# Patient Record
Sex: MALE | URBAN - METROPOLITAN AREA
[De-identification: names, ages, dates, MRNs, and addresses within clinical notes are randomized per-mention and may not be internally consistent; named-entity substitution may affect disease eponyms.]

---

## 2018-04-14 LAB
ALBUMIN SERPL BCP-MCNC: 4.6 G/DL (ref 3.5–5.7)
ALP SERPL-CCNC: 98 IU/L (ref 40–150)
ALT SERPL W P-5'-P-CCNC: 16 IU/L (ref 0–50)
ANION GAP SERPL CALCULATED.3IONS-SCNC: 13.8 MM/L
ANISOCYTOSIS (HISTORICAL): SLIGHT
APTT PPP: 31 SEC (ref 24.4–37.6)
AST SERPL W P-5'-P-CCNC: 13 U/L (ref 8–27)
BANDS (HISTORICAL): 2
BASOPHILS # BLD AUTO: 0 X3/UL (ref 0–0.3)
BASOPHILS # BLD AUTO: 0.1 % (ref 0–2)
BILIRUB SERPL-MCNC: 0.8 MG/DL (ref 0.3–1)
BUN SERPL-MCNC: 11 MG/DL (ref 7–25)
CALCIUM SERPL-MCNC: 9.5 MG/DL (ref 8.6–10.5)
CHLORIDE SERPL-SCNC: 102 MM/L (ref 98–107)
CO2 SERPL-SCNC: 22 MM/L (ref 21–31)
CREAT SERPL-MCNC: 1.02 MG/DL (ref 0.7–1.3)
DEPRECATED RDW RBC AUTO: 13.8 % (ref 11.5–14.5)
EGFR (HISTORICAL): > 60 GFR
EGFR AFRICAN AMERICAN (HISTORICAL): > 60 GFR
EOSINOPHIL # BLD AUTO: 0.1 X3/UL (ref 0–0.5)
EOSINOPHIL NFR BLD AUTO: 0.3 % (ref 0–5)
GLUCOSE (HISTORICAL): 114 MG/DL (ref 65–99)
HCT VFR BLD AUTO: 49.9 % (ref 42–52)
HGB BLD-MCNC: 16.2 G/DL (ref 14–18)
INR PPP: 1.19 (ref 0.9–1.5)
LACTATE DEHYDROGENASE FLUID (HISTORICAL): 1.6 MM/L (ref 0.5–2)
LYMPHOCYTES # BLD AUTO: 1.5 X3/UL (ref 1.2–4.2)
LYMPHOCYTES NFR BLD AUTO: 5 % (ref 20.5–51.1)
LYMPHOCYTES NFR BLD AUTO: 6.7 % (ref 20.5–51.1)
MACROCYTOSIS (HISTORICAL): SLIGHT
MCH RBC QN AUTO: 29.4 PG (ref 26–34)
MCHC RBC AUTO-ENTMCNC: 32.5 G/DL (ref 31–36)
MCV RBC AUTO: 90.7 FL (ref 81–99)
MONOCYTES # BLD AUTO: 1.5 X3/UL (ref 0–1)
MONOCYTES (HISTORICAL): 5 % (ref 1.7–12)
MONOCYTES NFR BLD AUTO: 7 % (ref 1.7–12)
NEUTROPHILS # BLD AUTO: 18.8 X3/UL (ref 1.4–6.5)
NEUTROPHILS ABS COUNT (HISTORICAL): 88 % (ref 42.2–75.2)
NEUTS SEG NFR BLD AUTO: 85.9 % (ref 42.2–75.2)
OSMOLALITY, SERUM (HISTORICAL): 269 MOSM (ref 262–291)
OVALOCYTOSIS (HISTORICAL): SLIGHT
PLATELET # BLD AUTO: 252 X3/UL (ref 130–400)
PLATELET ESTIMATE (HISTORICAL): NORMAL
PMV BLD AUTO: 8.1 FL (ref 8.6–11.7)
POTASSIUM SERPL-SCNC: 3.8 MM/L (ref 3.5–5.5)
PROTHROMBIN TIME (HISTORICAL): 13.8 SEC (ref 10.1–12.9)
RBC # BLD AUTO: 5.5 X6/UL (ref 4.3–5.9)
SODIUM SERPL-SCNC: 134 MM/L (ref 134–143)
TEAR DROP CELLS (HISTORICAL): SLIGHT
TOTAL PROTEIN (HISTORICAL): 6.9 G/DL (ref 6.4–8.9)
WBC # BLD AUTO: 21.9 X3/UL (ref 4.8–10.8)

## 2018-04-15 LAB
ALBUMIN SERPL BCP-MCNC: 3.8 G/DL (ref 3.5–5.7)
ALP SERPL-CCNC: 81 IU/L (ref 40–150)
ALT SERPL W P-5'-P-CCNC: 13 IU/L (ref 0–50)
ANION GAP SERPL CALCULATED.3IONS-SCNC: 10 MM/L
AST SERPL W P-5'-P-CCNC: 11 U/L (ref 8–27)
BASOPHILS # BLD AUTO: 0 X3/UL (ref 0–0.3)
BASOPHILS # BLD AUTO: 0.1 % (ref 0–2)
BILIRUB SERPL-MCNC: 1.1 MG/DL (ref 0.3–1)
BUN SERPL-MCNC: 11 MG/DL (ref 7–25)
CALCIUM SERPL-MCNC: 8.7 MG/DL (ref 8.6–10.5)
CHLORIDE SERPL-SCNC: 109 MM/L (ref 98–107)
CO2 SERPL-SCNC: 23 MM/L (ref 21–31)
CREAT SERPL-MCNC: 1.14 MG/DL (ref 0.7–1.3)
DEPRECATED RDW RBC AUTO: 14.1 % (ref 11.5–14.5)
EGFR (HISTORICAL): > 60 GFR
EGFR AFRICAN AMERICAN (HISTORICAL): > 60 GFR
EOSINOPHIL # BLD AUTO: 0.2 X3/UL (ref 0–0.5)
EOSINOPHIL NFR BLD AUTO: 1 % (ref 0–5)
GLUCOSE (HISTORICAL): 125 MG/DL (ref 65–99)
HCT VFR BLD AUTO: 45.4 % (ref 42–52)
HGB BLD-MCNC: 15 G/DL (ref 14–18)
LYMPHOCYTES # BLD AUTO: 3.5 X3/UL (ref 1.2–4.2)
LYMPHOCYTES NFR BLD AUTO: 17.8 % (ref 20.5–51.1)
MAGNESIUM SERPL-MCNC: 2.1 MG/DL (ref 1.9–2.7)
MCH RBC QN AUTO: 30.4 PG (ref 26–34)
MCHC RBC AUTO-ENTMCNC: 33 G/DL (ref 31–36)
MCV RBC AUTO: 92 FL (ref 81–99)
MONOCYTES # BLD AUTO: 1.4 X3/UL (ref 0–1)
MONOCYTES NFR BLD AUTO: 7.1 % (ref 1.7–12)
NEUTROPHILS # BLD AUTO: 14.3 X3/UL (ref 1.4–6.5)
NEUTS SEG NFR BLD AUTO: 74 % (ref 42.2–75.2)
OSMOLALITY, SERUM (HISTORICAL): 277 MOSM (ref 262–291)
PHOSPHATE SERPL-MCNC: 2.8 MG/DL (ref 3–5.5)
PLATELET # BLD AUTO: 225 X3/UL (ref 130–400)
PMV BLD AUTO: 8.1 FL (ref 8.6–11.7)
POTASSIUM SERPL-SCNC: 4 MM/L (ref 3.5–5.5)
RBC # BLD AUTO: 4.93 X6/UL (ref 4.3–5.9)
SODIUM SERPL-SCNC: 138 MM/L (ref 134–143)
TOTAL PROTEIN (HISTORICAL): 5.8 G/DL (ref 6.4–8.9)
WBC # BLD AUTO: 19.4 X3/UL (ref 4.8–10.8)

## 2018-04-16 LAB
BASOPHILS # BLD AUTO: 0 X3/UL (ref 0–0.3)
BASOPHILS # BLD AUTO: 0.3 % (ref 0–2)
DEPRECATED RDW RBC AUTO: 13.9 % (ref 11.5–14.5)
EOSINOPHIL # BLD AUTO: 0.2 X3/UL (ref 0–0.5)
EOSINOPHIL NFR BLD AUTO: 1.6 % (ref 0–5)
EST. AVERAGE GLUCOSE BLD GHB EST-MCNC: 110 MG/DL
HBA1C MFR BLD HPLC: 5.5 % (ref 4–6.2)
HCT VFR BLD AUTO: 44.7 % (ref 42–52)
HGB BLD-MCNC: 14.5 G/DL (ref 14–18)
LYMPHOCYTES # BLD AUTO: 2.7 X3/UL (ref 1.2–4.2)
LYMPHOCYTES NFR BLD AUTO: 21.5 % (ref 20.5–51.1)
MCH RBC QN AUTO: 30 PG (ref 26–34)
MCHC RBC AUTO-ENTMCNC: 32.6 G/DL (ref 31–36)
MCV RBC AUTO: 92.1 FL (ref 81–99)
MONOCYTES # BLD AUTO: 1 X3/UL (ref 0–1)
MONOCYTES NFR BLD AUTO: 7.5 % (ref 1.7–12)
NEUTROPHILS # BLD AUTO: 8.8 X3/UL (ref 1.4–6.5)
NEUTS SEG NFR BLD AUTO: 69.1 % (ref 42.2–75.2)
PLATELET # BLD AUTO: 223 X3/UL (ref 130–400)
PMV BLD AUTO: 8.3 FL (ref 8.6–11.7)
RBC # BLD AUTO: 4.85 X6/UL (ref 4.3–5.9)
VANCOMYCIN TROUGH (HISTORICAL): 9.6 MCQ/M
WBC # BLD AUTO: 12.7 X3/UL (ref 4.8–10.8)

## 2018-04-17 LAB
ANION GAP SERPL CALCULATED.3IONS-SCNC: 11.9 MM/L
BASOPHILS # BLD AUTO: 0 X3/UL (ref 0–0.3)
BASOPHILS # BLD AUTO: 0.5 % (ref 0–2)
BUN SERPL-MCNC: 10 MG/DL (ref 7–25)
CALCIUM SERPL-MCNC: 8.6 MG/DL (ref 8.6–10.5)
CHLORIDE SERPL-SCNC: 107 MM/L (ref 98–107)
CO2 SERPL-SCNC: 25 MM/L (ref 21–31)
CREAT SERPL-MCNC: 1.06 MG/DL (ref 0.7–1.3)
DEPRECATED RDW RBC AUTO: 13.8 % (ref 11.5–14.5)
EGFR (HISTORICAL): > 60 GFR
EGFR AFRICAN AMERICAN (HISTORICAL): > 60 GFR
EOSINOPHIL # BLD AUTO: 0.3 X3/UL (ref 0–0.5)
EOSINOPHIL NFR BLD AUTO: 2.9 % (ref 0–5)
GLUCOSE (HISTORICAL): 89 MG/DL (ref 65–99)
HCT VFR BLD AUTO: 44.7 % (ref 42–52)
HGB BLD-MCNC: 14.9 G/DL (ref 14–18)
LYMPHOCYTES # BLD AUTO: 3.2 X3/UL (ref 1.2–4.2)
LYMPHOCYTES NFR BLD AUTO: 34.9 % (ref 20.5–51.1)
MCH RBC QN AUTO: 30.3 PG (ref 26–34)
MCHC RBC AUTO-ENTMCNC: 33.2 G/DL (ref 31–36)
MCV RBC AUTO: 91.3 FL (ref 81–99)
MONOCYTES # BLD AUTO: 0.7 X3/UL (ref 0–1)
MONOCYTES NFR BLD AUTO: 7.4 % (ref 1.7–12)
NEUTROPHILS # BLD AUTO: 4.9 X3/UL (ref 1.4–6.5)
NEUTS SEG NFR BLD AUTO: 54.3 % (ref 42.2–75.2)
OSMOLALITY, SERUM (HISTORICAL): 278 MOSM (ref 262–291)
PHOSPHATE SERPL-MCNC: 3.9 MG/DL (ref 3–5.5)
PLATELET # BLD AUTO: 235 X3/UL (ref 130–400)
PMV BLD AUTO: 8.3 FL (ref 8.6–11.7)
POTASSIUM SERPL-SCNC: 3.9 MM/L (ref 3.5–5.5)
RBC # BLD AUTO: 4.9 X6/UL (ref 4.3–5.9)
SODIUM SERPL-SCNC: 140 MM/L (ref 134–143)
WBC # BLD AUTO: 9.1 X3/UL (ref 4.8–10.8)

## 2018-05-14 ENCOUNTER — LAB REQUISITION (OUTPATIENT)
Dept: LAB | Facility: HOSPITAL | Age: 48
End: 2018-05-14
Payer: COMMERCIAL

## 2018-05-14 DIAGNOSIS — T81.89XA OTHER COMPLICATIONS OF PROCEDURES, NOT ELSEWHERE CLASSIFIED, INITIAL ENCOUNTER: ICD-10-CM

## 2018-05-14 LAB — SURGICAL PATHOLOGY (HISTORICAL): NORMAL

## 2018-05-14 PROCEDURE — 88304 TISSUE EXAM BY PATHOLOGIST: CPT | Performed by: PATHOLOGY

## 2019-02-04 ENCOUNTER — CLINICAL SUPPORT (OUTPATIENT)
Dept: CARDIAC REHAB | Facility: HOSPITAL | Age: 49
End: 2019-02-04
Payer: COMMERCIAL

## 2019-02-04 VITALS — BODY MASS INDEX: 27.9 KG/M2 | WEIGHT: 206 LBS | HEIGHT: 72 IN

## 2019-02-04 DIAGNOSIS — Z95.5 STENTED CORONARY ARTERY: ICD-10-CM

## 2019-02-04 PROCEDURE — 93798 PHYS/QHP OP CAR RHAB W/ECG: CPT | Performed by: PHYSICAL THERAPIST

## 2019-02-04 NOTE — PROGRESS NOTES
Cardiac Rehabilitation Plan of Care   Care Plan       Today's date: 2019   Visits: 1 out of 36  Patient name: Milburn Prader      : 1970  Age: 51 y o  MRN: [id number]  Referring Physician: Macie Park  Provider: [facility], [facility]  Clinician: ALOK Gudino    Dx: MI  Date of onset:19      SUMMARY OF PROGRESS:  Patient willing to participate in CR 3 X week X 12 weeks or 36 sessions  Patient seen for initial evaluation this date and performed Sub Max EET on TM  Resting vitals: HR 85, /58 and O2 Sat 95%  Peak vitals HR: 112, /84 and O2 Sat 93%  Recovery vitals: HR 76, /68 and O2 Sat 94%  Patient denied any angina or SOB  RPE 3-5 during sub max test  Patient was able to achieve a 5 8 MET level  Patient's goals are to improve his activity tolerance in order to return to full duty work at [employer]  Patient must be able to tolerate heavy duty work in high temperatures  Patient would also like to work on smoking cessation and improve his diet  Patient is an excellent rehabilitation candidate due to his high prior level of function and willingness to participate/improve  Patient issued handouts on BP categories, heart attack zone tool and heart healthy eating      Medication compliance: Yes   Comments: Patient admits to 100% medication compliane  Fall Risk: Low   Comments: Patient exhibits Normal standing balance and 5/5 BLE strength    EKG changes: Normal Sinus w/T wave inversion      EXERCISE ASSESSMENT and PLAN    Current Exercise Program in Rehab:       Frequency: 3 days/week        Minutes: 30-40         METS: 3 to 4            HR: 20-30 > RHR of 85   RPE:4-6         Modalities: Treadmill, UBE and NuStep      Exercise Progression 30 Day Goals :    Frequency: 5 days/week   Minutes: 45-50   METS: 4 to 5   HR: 20-30 > RHR   RPE: 4-6   Modalities: Treadmill, UBE, Eliptical , NuStep and Recumbent bike    Strength trainin-3 days / week, 12-15 repitations and 1-2 sets per modality    Modalities: Leg Press and Arm Curl    Progressing: In Progress    Home Exercise: Home walking program 2-3 days per week  Goals: 10% improvement in functional capacity, Reduced Dyspnea with physical activity  0-1/10, improved DASI score by 10%, Increase in peak CR METs by 40%, Improved 6MWT distance by 10%, Resume ADLs with increased strength, Return to work unrestricted and Exercise 5 days/wk, >150mins/wk  Education: Benefit of exercise for CAD risk factors, home exercise guidelines, signs and sxs and RPE scale   Plan:education on home exercise guidelines  Readiness to change: Action      NUTRITION ASSESSMENT AND PLAN    Weight control:    Starting weight: 206 lb   Current weight:   206 lb  Waist circumference:    Starting:    Current:    Diabetes: N/A  Lipid management: Discussed diet and lipid management  Goals:reduced BMI to < 25 and Improved Rate Your Plate score  >57  Education: heart healthy eating  low sodium diet  hydration  diet and lipid management  healthy choices while dining out  portion control  Progressing: In Progress  Plan: Increase whole grains, increase fruits/vegs, eliminate processed meats, reduce red meat 1x/wk, swtich to low fat dairy and Reduce added sugars <25g/day  Readiness to change: Action      PSYCHOSOCIAL ASSESSMENT AND PLAN    Emotional:              1-4 = Minimal Depression  Self-reported stress level: 5   Social support: Good   Goals:  improved [facility] QOL < 27, PHQ-9 - reduced severity by one level, Increased interest in doing things, improved sleep and improved positive thoughts of well being  Education: signs/sxs of depression, benefits of positive support system, coping mechanisms and depression and CAD  Progressing: In Progress  Plan: Practice relaxation techniques  Readiness to change: Action      OTHER CORE COMPONENTS     Tobacco: Patient states he smokes 2 cigarettes per day   Patient states he had been smoking up to 2 packs per day at one point   History   Smoking Status    Not on file   Smokeless Tobacco    Not on file       Tobacco Use Intervention: Referral to tobacco expert:   Brief counseling by cardiac rehab professional  Date: 19    Blood pressure:    Restin/58   Exercise: 152/84   Recovery: 120/68  Goals: consistent BP < 130/80, reduced dietary sodium <2300mg, consistent exercise >150 mins/wk, reduce number of cigarettes/day and set a target quit date  Education:  understanding HTN and CAD, low sodium diet and HTN, tobacco triggers, setting a smoking cessation plan, relapse education and provide information on tobacco cessation treatment  Progressing: In Progress  Plan: Class: Understanding Heart Disease, Class: Common Heart Medications, Set target quit date for smoking, Complete abstention from smoking and speak to MD about nicotine replacement therapy  Readiness to change: Preparation

## 2019-02-04 NOTE — PROGRESS NOTES
Name:Waylon Dow  Risk:      MOD        EF %                          Pre Post       Date: 02/04/2019   Goal      Physical         Sub Max ETT (METs) 5 8  10% increase      6MWT (Feet/METs)   10% increase      PAGE AI (est  peak O2)         Peak exercise CR METs   40% increase      Emotional         PHQ 9  (> 5 refer to MD) 3  4 pt decrease      [facility]           Total Score 25  < 27    (lower score = improvement)     Feelings 1  < 3      Physical Fitness 4  < 3      Social Support 5  < 3      Daily Activity 3  < 3      Social Activities 1  < 3      Pain 3  < 3      Overall Health 3  < 3      Quality of Life 4  < 3      Change in Health 3  < 3      Dietary         Rate your plate 62  > 58      Measurements         Weight 206  2 5-5%       BMI 6'  19-25      Waist Circ     < 40 M / < 35 F      % Body fat NA  < 25 M / < 33 F       BP left arm     (systolic) 282  < 754                                 (diastolic) 58  < 90      Smoking #/day (if applicable) 2 cigs per day  0      Lipids/ Glucose (Date)         Total cholesterol na        Triglycerides na  < 150      HDL na  40-60      LDL na  < 100      A1C % na  4 0 - 5 6%      Fasting Blood Glucose na

## 2019-02-04 NOTE — PROGRESS NOTES
CARDIAC REHAB ASSESSMENT    Today's date: 2019  Patient name: Antoine Fleming     : 1970       MRN: [id number]  PCP: Josefina Malcolm DO  Referring Physician: Brandon Interiano  Cardiologist: Dr Dez Malik  Surgeon:   Dx: MI  Date of onset: 19  Cultural needs: None    Height:  6'0"   Wt Readings from Last 1 Encounters:   No data found for Wt      Weight: 206 LB  Ht Readings from Last 1 Encounters:   No data found for Ht     Medical History: Per patient HTN, MI and basal cell skin cancer on his left shoulder  Physical Limitations: None    Risk Factors   Cholesterol: Yes  Smoking: Current user:  average ppd:  2 cigs per day  HTN: Yes  DM: No  Obesity: No   Inactivity: No  Family History:No family history on file  Allergies: Patient has no allergy information on record  ETOH:   History   Alcohol use Not on file         Current Medications: Per patient Aspirin 81 mg 1X, Metoprolol 50 mg 1X, Atorvastatin 80 mg 1X, Clopidogrel 75 mg 1X and Losartan 25 mg 1X  No current outpatient prescriptions on file  No current facility-administered medications for this visit  Functional Status Prior to Diagnosis for Treatment   Occupation: full time job at [employer]: resumed all ADLs Capable of performing light ADLs only able to perform self-care resumed driving  [place]: Capable of performing light ADLs only able to perform self-care resumed driving  Exercise: Patient states he is normally very active at home  Patient does not perform a regular exercise program   Other: Patient must return to a heavy duty job in high temperatures    Current Functional Status  Occupation: plans to return to work - date undetermined  Recreation: Outdoor activities  ADLs:Capable of performing light ADLs only able to perform self-care resumed driving  [place]: Independent w/all personal ADLs   Able to perform light household chores  Exercise:Patient encouraged to exercise/walk 2x week or his days off CR  Other:     Short Term Program Goals: dietary modifications increased strength improved energy/stamina with ADLs exercise 120-150 mins/wk return to work    Long Term Goals: increased maximal walking duration  increased intial training workload  Improved [facility] Activity Status score  Improved functional capacity  Improved Quality of Life - [facility] score reduced  Smoking cessation  Abstain from smoking  Reduced stress    Ability to reach goals/rehabilitation potential:  Excellent    Projected return to function: 12 weeks  Objective tests: sub-max TM ETT      Nutritional   Reviewed details of Rate your Plate  Discussed key elements of heart healthy eating  Reviewed patient goals for dietary modifications and their clinical implications  Reviewed most recent lipid profile  Goals for dietary modification: choose lean cuts of meat  eliminate processed meats  increase fish intake  more meatless meals  increase whole grains  increase fruits and vegetables  low sodium  improved snack choices  more nuts/seeds      Emotional/Social  Reports sufficient emotional support    SOCIAL SUPPORT NETWORK    Marital status:       Perceived Stress: 3-5/10   Stressors: Health and employment   Goals for Stress Management: Reduce stress level to <3/10  Domestic Violence Screening: No    Comments: Patient is an excellent rehabilitation candidate due to high prior level of function and willingness to progress  Patient's goals are to return to work full duty, improve eating habits and smoking cessation  Patient willing to participate in CR 3 X week x 12 weeks or 36 sessions  Patient's program will be progressed as he is able to tolerate  Patient will also receive education specific to his disease process

## 2019-02-05 NOTE — PROGRESS NOTES
OUTCOMES    Name: Shivani Camilo 1970      Risk:      MOD        Pre Post % change  Goal   Date: 2/4/2019      Physical       Sub Max ETT (mets) 5 8  -100 0% 10% increase   6MWT (feet) NA  #VALUE! 10% increase   UCSD Dyspnea Score NA  #VALUE! 5 pt decrease   Supplemental O2 use (L) 0      DUKE AI (est  peak O2) 20 7  -100 0%    COPD assessment Test (CAT) NA   2 pt decrease   Peak exercise CR/ IA (mets) NA  #VALUE! 40% increase   Emotional       PHQ 9  (> 10 refer to MD) 3  -100 0% 4 pt decrease   [facility] = improvement     Total  25  -100 0% < 27   Feelings 1  -100 0% < 3   Physical fitness 4  -100 0% < 3   Social Support 5  0 0% < 3   Daily activities 3  -100 0% < 3   Social Activities 1  -100 0% < 3   Pain 3  -100 0% < 3   Overall Health 3  -100 0% < 3   Quality of Life 4  -100 0% < 3   Change in health 3  -100 0% < 3   Dietary       Rate your plate 62  -342 4% > 58   Measurements       Weight 206  -100 0% 2 5-5%    BMI 27 9  -100 0% 19-25   Waist Circ   NA  #VALUE! < 40 M / < 35 F   % Body fat NA  #VALUE! < 25 M / < 33 F    BP left arm     (systolic) 003  -952 5% < 868                              (diastolic) 58  -729 8% < 90   Smoking #/day (if applicable) 2  -182 0% 0   Lipids/ Glucose (Date)       Total cholesterol NA  #VALUE!    Triglycerides NA  #VALUE! < 150   HDL NA  #VALUE! 40-60   LDL NA  #VALUE! < 100   A1C % NA  #VALUE! 4 0 - 5 6%   Fasting BG NA  #VALUE!           Physician signature: _______________________ _________________     Date:

## 2019-02-06 ENCOUNTER — CLINICAL SUPPORT (OUTPATIENT)
Dept: CARDIAC REHAB | Facility: HOSPITAL | Age: 49
End: 2019-02-06
Payer: COMMERCIAL

## 2019-02-06 DIAGNOSIS — Z95.5 S/P CORONARY ARTERY STENT PLACEMENT: ICD-10-CM

## 2019-02-06 PROCEDURE — 93798 PHYS/QHP OP CAR RHAB W/ECG: CPT

## 2019-02-08 ENCOUNTER — CLINICAL SUPPORT (OUTPATIENT)
Dept: CARDIAC REHAB | Facility: HOSPITAL | Age: 49
End: 2019-02-08
Payer: COMMERCIAL

## 2019-02-08 DIAGNOSIS — Z95.5 STENTED CORONARY ARTERY: ICD-10-CM

## 2019-02-08 PROCEDURE — 93798 PHYS/QHP OP CAR RHAB W/ECG: CPT | Performed by: PHYSICAL THERAPIST

## 2019-02-11 ENCOUNTER — CLINICAL SUPPORT (OUTPATIENT)
Dept: CARDIAC REHAB | Facility: HOSPITAL | Age: 49
End: 2019-02-11
Payer: COMMERCIAL

## 2019-02-11 DIAGNOSIS — Z95.5 STATUS POST CORONARY ARTERY STENT PLACEMENT: ICD-10-CM

## 2019-02-11 PROCEDURE — 93798 PHYS/QHP OP CAR RHAB W/ECG: CPT

## 2019-02-13 ENCOUNTER — APPOINTMENT (OUTPATIENT)
Dept: CARDIAC REHAB | Facility: HOSPITAL | Age: 49
End: 2019-02-13
Payer: COMMERCIAL

## 2019-02-18 ENCOUNTER — CLINICAL SUPPORT (OUTPATIENT)
Dept: CARDIAC REHAB | Facility: HOSPITAL | Age: 49
End: 2019-02-18
Payer: COMMERCIAL

## 2019-02-18 DIAGNOSIS — Z95.5 S/P CORONARY ARTERY STENT PLACEMENT: ICD-10-CM

## 2019-02-18 PROCEDURE — 93798 PHYS/QHP OP CAR RHAB W/ECG: CPT

## 2019-02-20 ENCOUNTER — APPOINTMENT (OUTPATIENT)
Dept: CARDIAC REHAB | Facility: HOSPITAL | Age: 49
End: 2019-02-20
Payer: COMMERCIAL

## 2019-02-22 ENCOUNTER — CLINICAL SUPPORT (OUTPATIENT)
Dept: CARDIAC REHAB | Facility: HOSPITAL | Age: 49
End: 2019-02-22
Payer: COMMERCIAL

## 2019-02-22 DIAGNOSIS — Z95.5 STENTED CORONARY ARTERY: ICD-10-CM

## 2019-02-22 PROCEDURE — 93798 PHYS/QHP OP CAR RHAB W/ECG: CPT

## 2019-02-25 ENCOUNTER — APPOINTMENT (OUTPATIENT)
Dept: CARDIAC REHAB | Facility: HOSPITAL | Age: 49
End: 2019-02-25
Payer: COMMERCIAL

## 2019-02-25 NOTE — PROGRESS NOTES
Cardiac Rehabilitation Plan of Care   DISCHARGE      Today's date: 2019   Visits:   Patient name: Winnie Del Castillo      : 1970  Age: 51 y o  MRN: [id number]  Referring Physician: Brittany   Provider: Merlinda Bigger  Clinician: Adamaris Warner Sep, MPT    Dx:   Encounter Diagnosis   Name Primary?  Stented coronary artery      Date of onset: 19    SUMMARY OF PROGRESS:Patient attended 7 sessions of CR  Patient's maximal MET level achieved was a 5 8 METs  Patient attained his goal of returning to full duty work at [employer]  Patient feels he is able to tolerate heavy duty work in high temperatures  Patient also  working on smoking cessation  He is not wearing Nicotine patches regularly  Patient states he has cut his smoking from 1 to packs a day to 1/2 pack daily  Patient states he has also improved his diet  Patient had normal hemodynamic responses during his exercise sessions  Inverted T wave noted on his telemetry  Patient has not had a follow up Lipid Panel drawn at this time  Patient has a follow up w/Dr Faviola Alan in about 2 months          Medication compliance: Yes   Comments: Patient admits to 100% medication compliance  Fall Risk: Low   Comments: Patient exhibits Normal standing balance and BLE strength   EKG changes: Inverted T wave  EXERCISE ASSESSMENT and PLAN    Current Exercise Program in Rehab:       Frequency: 3-5 days/week        Minutes: 45-50         METS: 5 8 to 6 0         HR: 20-30 >RHR  105 to 115   RPE: 4-7         Modalities: Treadmill, UBE and NuStep      Exercise Progression 30 Day Goals :    Frequency: 5 days/week   Minutes: 45-50   METS: 5 8 to 6 0   HR: 20-30 >RHR  105-115   RPE: 4-7   Modalities: Treadmill, UBE and NuStep    Strength trainin-3 days / week, 12-15 repitations  and 1-2 sets per modality    Modalities: Leg Press    Progressing:   In Progress    Home Exercise: Type: Outdoor Walking Program weather permitting, Frequency:3-6 days/week, Duration: 45 to 60 mins    Goals: Resume ADLs with increased strength and Return to work unrestricted  Education: Benefit of exercise for CAD risk factors, home exercise guidelines, signs and sxs and RPE scale   Plan:education on home exercise guidelines, home exercise 30+ mins 2 days opposite CR and Education class: Risk Factors for Heart Disease  Readiness to change: Maintenance      NUTRITION ASSESSMENT AND PLAN    Weight control:    Starting weight: 206 lb   Current weight:   206 lb  Waist circumference:    Starting: NA   Current:  NA  Diabetes: N/A  Lipid management: Discussed diet and lipid management  Goals:LDL <100, HDL >40, TRG <150 and CHOL <200  Education: heart healthy eating  low sodium diet  hydration  healthy choices while dining out  Progressing: In Progress  Plan: Decrease SFA, Increase whole grains, increase fruits/vegs, eliminate processed meats, reduce red meat 1x/wk and Reduce added sugars <25g/day  Readiness to change: Action      PSYCHOSOCIAL ASSESSMENT AND PLAN    Emotional:              1-4 = Minimal Depression  Self-reported stress level: 1   Social support: Excellent   Goals:  Reduce perceived stress to 1-3/10, improved [facility] QOL < 27 and PHQ-9 - reduced severity by one level  Education: signs/sxs of depression, benefits of positive support system, coping mechanisms and depression and CAD  Progressing: In Progress  Plan: Practice relaxation techniques  Readiness to change: Maintenance      OTHER CORE COMPONENTS     Tobacco:   Social History     Tobacco Use   Smoking Status Not on file       Tobacco Use Intervention: Referral to tobacco expert:   Brief counseling by cardiac rehab professional  Date: During each session    Blood pressure:    Restin/52   Exercise: 142/76   Recovery: 124/52    Goals: consistent exercise >150 mins/wk, reduce number of cigarettes/day and tobacco cessation in 3-4  weeks  Education:  understanding HTN and CAD, low sodium diet and HTN, smoking and heart disease, setting a smoking cessation plan and provide information on tobacco cessation treatment  Progressing: In Progress  Plan: Set target quit date for smoking and speak to MD about nicotine replacement therapy  Readiness to change: Action

## 2019-02-25 NOTE — PROGRESS NOTES
Francis Garcia      Dear Dr Marc Hurtado    Thank you for referring your patient to our cardiac rehabilitation program  The patient has completed  7 visits  However, rehab is being discontinued at this time due to: Returning to work 02/25/2019  Please contact us at [phone] if you have any questions about this patents case or if/when it is appropriate to re-start the patients rehab program at a later date  Thank you for your continued support of cardiac rehabilitation  Sincerely,  Robles Shannon, MPT

## 2019-02-25 NOTE — PROGRESS NOTES
Name: Gianna Mccann 1970      Risk:      MOD        Pre Post % change  Goal   Date: 2/4/2019 2/22/2019     Physical       Sub Max ETT (mets) 5 8 5 8 0 0% 10% increase   6MWT (feet) NA  #VALUE! 10% increase   UCSD Dyspnea Score NA  #VALUE! 5 pt decrease   Supplemental O2 use (L) 0 0     DUKE AI (est  peak O2) 20 7  -100 0%    COPD assessment Test (CAT) NA NA  2 pt decrease   Peak exercise CR/ TN (mets) 5 8 5 8 0 0% 40% increase   Emotional       PHQ 9  (> 10 refer to MD) 3 0 -100 0% 4 pt decrease   [facility] = improvement     Total  25 12 -52 0% < 27   Feelings 1 1 0 0% < 3   Physical fitness 4 2 -50 0% < 3   Social Support 5 1 20 0% < 3   Daily activities 3 1 -66 7% < 3   Social Activities 1 1 0 0% < 3   Pain 3 1 -66 7% < 3   Overall Health 3 2 -33 3% < 3   Quality of Life 4 2 -50 0% < 3   Change in health 3 1 -66 7% < 3   Dietary       Rate your plate 62 62 3 3% > 58   Measurements       Weight 206 2016 lb #VALUE! 2 5-5%    BMI 27 9 27 9 0 0% 19-25   Waist Circ   NA NA #VALUE! < 40 M / < 35 F   % Body fat NA NA #VALUE! < 25 M / < 33 F    BP left arm     (systolic) 108 264 -2 6% < 140                              (diastolic) 58 58 8 5% < 90   Smoking #/day (if applicable) Was 1-2 packs daily 1/2 pack  #VALUE! 0   Lipids/ Glucose (Date)       Total cholesterol NA NA #VALUE!    Triglycerides NA NA #VALUE! < 150   HDL NA NA #VALUE! 40-60   LDL NA NA #VALUE! < 100   A1C % NA NA #VALUE! 4 0 - 5 6%   Fasting BG NA NA #VALUE!           Physician signature: _______________________ _________________     Date:

## 2019-02-27 ENCOUNTER — APPOINTMENT (OUTPATIENT)
Dept: CARDIAC REHAB | Facility: HOSPITAL | Age: 49
End: 2019-02-27
Payer: COMMERCIAL

## 2019-08-21 ENCOUNTER — TRANSCRIBE ORDERS (OUTPATIENT)
Dept: LAB | Facility: HOSPITAL | Age: 49
End: 2019-08-21

## 2019-08-21 ENCOUNTER — APPOINTMENT (OUTPATIENT)
Dept: LAB | Facility: HOSPITAL | Age: 49
End: 2019-08-21
Payer: COMMERCIAL

## 2019-08-21 DIAGNOSIS — I10 HYPERTENSION, ESSENTIAL: ICD-10-CM

## 2019-08-21 DIAGNOSIS — E78.5 HYPERLIPIDEMIA, UNSPECIFIED HYPERLIPIDEMIA TYPE: ICD-10-CM

## 2019-08-21 DIAGNOSIS — E78.5 HYPERLIPIDEMIA, UNSPECIFIED HYPERLIPIDEMIA TYPE: Primary | ICD-10-CM

## 2019-08-21 LAB
ALBUMIN SERPL BCP-MCNC: 4.3 G/DL (ref 3.5–5.7)
ALP SERPL-CCNC: 88 U/L (ref 40–150)
ALT SERPL W P-5'-P-CCNC: 20 U/L (ref 7–52)
ANION GAP SERPL CALCULATED.3IONS-SCNC: 6 MMOL/L (ref 4–13)
AST SERPL W P-5'-P-CCNC: 13 U/L (ref 13–39)
BILIRUB SERPL-MCNC: 1 MG/DL (ref 0.2–1)
BUN SERPL-MCNC: 18 MG/DL (ref 7–25)
CALCIUM SERPL-MCNC: 9.3 MG/DL (ref 8.6–10.5)
CHLORIDE SERPL-SCNC: 104 MMOL/L (ref 98–107)
CHOLEST SERPL-MCNC: 122 MG/DL (ref 0–200)
CO2 SERPL-SCNC: 27 MMOL/L (ref 21–31)
CREAT SERPL-MCNC: 1.19 MG/DL (ref 0.7–1.3)
GFR SERPL CREATININE-BSD FRML MDRD: 71 ML/MIN/1.73SQ M
GLUCOSE P FAST SERPL-MCNC: 101 MG/DL (ref 65–99)
HDLC SERPL-MCNC: 32 MG/DL (ref 40–60)
LDLC SERPL CALC-MCNC: 71 MG/DL (ref 0–100)
NONHDLC SERPL-MCNC: 90 MG/DL
POTASSIUM SERPL-SCNC: 3.8 MMOL/L (ref 3.5–5.5)
PROT SERPL-MCNC: 6.9 G/DL (ref 6.4–8.9)
SODIUM SERPL-SCNC: 137 MMOL/L (ref 134–143)
TRIGL SERPL-MCNC: 93 MG/DL (ref 44–166)

## 2019-08-21 PROCEDURE — 36415 COLL VENOUS BLD VENIPUNCTURE: CPT

## 2019-08-21 PROCEDURE — 80061 LIPID PANEL: CPT

## 2019-08-21 PROCEDURE — 80053 COMPREHEN METABOLIC PANEL: CPT

## 2021-10-19 ENCOUNTER — TELEPHONE (OUTPATIENT)
Dept: DERMATOLOGY | Facility: CLINIC | Age: 51
End: 2021-10-19

## 2021-11-01 ENCOUNTER — TELEPHONE (OUTPATIENT)
Dept: DERMATOLOGY | Facility: CLINIC | Age: 51
End: 2021-11-01

## 2021-11-08 ENCOUNTER — CONSULT (OUTPATIENT)
Dept: DERMATOLOGY | Facility: CLINIC | Age: 51
End: 2021-11-08
Payer: COMMERCIAL

## 2021-11-08 VITALS — WEIGHT: 202.8 LBS | TEMPERATURE: 97.5 F | BODY MASS INDEX: 27.47 KG/M2 | HEIGHT: 72 IN

## 2021-11-08 DIAGNOSIS — D04.61 SQUAMOUS CELL CARCINOMA IN SITU (SCCIS) OF SKIN OF RIGHT FOREARM: Primary | ICD-10-CM

## 2021-11-08 PROCEDURE — 99203 OFFICE O/P NEW LOW 30 MIN: CPT | Performed by: DERMATOLOGY

## 2021-11-08 RX ORDER — METOPROLOL SUCCINATE 50 MG/1
50 TABLET, EXTENDED RELEASE ORAL DAILY
COMMUNITY
Start: 2021-10-26

## 2021-11-08 RX ORDER — ASPIRIN 81 MG/1
81 TABLET, CHEWABLE ORAL DAILY
COMMUNITY
Start: 2021-10-14 | End: 2022-10-14

## 2021-11-08 RX ORDER — LOSARTAN POTASSIUM 50 MG/1
50 TABLET ORAL DAILY
COMMUNITY
Start: 2021-10-14

## 2021-11-08 RX ORDER — ATORVASTATIN CALCIUM 80 MG/1
TABLET, FILM COATED ORAL
COMMUNITY
Start: 2021-10-14

## 2021-12-06 ENCOUNTER — TELEPHONE (OUTPATIENT)
Dept: DERMATOLOGY | Facility: CLINIC | Age: 51
End: 2021-12-06

## 2021-12-06 ENCOUNTER — PROCEDURE VISIT (OUTPATIENT)
Dept: DERMATOLOGY | Facility: CLINIC | Age: 51
End: 2021-12-06
Payer: COMMERCIAL

## 2021-12-06 VITALS
HEART RATE: 70 BPM | WEIGHT: 205.8 LBS | HEIGHT: 72 IN | SYSTOLIC BLOOD PRESSURE: 156 MMHG | BODY MASS INDEX: 27.87 KG/M2 | DIASTOLIC BLOOD PRESSURE: 96 MMHG | TEMPERATURE: 98.1 F

## 2021-12-06 DIAGNOSIS — D04.61 SQUAMOUS CELL CARCINOMA IN SITU (SCCIS) OF SKIN OF RIGHT FOREARM: Primary | ICD-10-CM

## 2021-12-06 PROCEDURE — 17314 MOHS ADDL STAGE T/A/L: CPT | Performed by: DERMATOLOGY

## 2021-12-06 PROCEDURE — 17313 MOHS 1 STAGE T/A/L: CPT | Performed by: DERMATOLOGY

## 2021-12-06 PROCEDURE — 12032 INTMD RPR S/A/T/EXT 2.6-7.5: CPT | Performed by: DERMATOLOGY

## 2021-12-10 ENCOUNTER — TELEPHONE (OUTPATIENT)
Dept: DERMATOLOGY | Facility: CLINIC | Age: 51
End: 2021-12-10

## 2021-12-14 ENCOUNTER — OFFICE VISIT (OUTPATIENT)
Dept: DERMATOLOGY | Facility: CLINIC | Age: 51
End: 2021-12-14
Payer: COMMERCIAL

## 2021-12-14 DIAGNOSIS — Z48.89 ENCOUNTER FOR POST SURGICAL WOUND CHECK: Primary | ICD-10-CM

## 2021-12-14 PROCEDURE — 87205 SMEAR GRAM STAIN: CPT | Performed by: DERMATOLOGY

## 2021-12-14 PROCEDURE — 87070 CULTURE OTHR SPECIMN AEROBIC: CPT | Performed by: DERMATOLOGY

## 2021-12-14 PROCEDURE — 87186 SC STD MICRODIL/AGAR DIL: CPT | Performed by: DERMATOLOGY

## 2021-12-14 PROCEDURE — 99024 POSTOP FOLLOW-UP VISIT: CPT | Performed by: DERMATOLOGY

## 2021-12-17 LAB
BACTERIA WND AEROBE CULT: ABNORMAL
GRAM STN SPEC: ABNORMAL
GRAM STN SPEC: ABNORMAL

## 2021-12-20 ENCOUNTER — OFFICE VISIT (OUTPATIENT)
Dept: DERMATOLOGY | Facility: CLINIC | Age: 51
End: 2021-12-20

## 2021-12-20 DIAGNOSIS — Z48.02 ENCOUNTER FOR REMOVAL OF SUTURES: Primary | ICD-10-CM

## 2021-12-20 PROCEDURE — RECHECK: Performed by: DERMATOLOGY
